# Patient Record
Sex: FEMALE | Race: WHITE | NOT HISPANIC OR LATINO | Employment: OTHER
[De-identification: names, ages, dates, MRNs, and addresses within clinical notes are randomized per-mention and may not be internally consistent; named-entity substitution may affect disease eponyms.]

---

## 2023-04-02 ENCOUNTER — HEALTH MAINTENANCE LETTER (OUTPATIENT)
Age: 68
End: 2023-04-02

## 2023-06-01 ENCOUNTER — HEALTH MAINTENANCE LETTER (OUTPATIENT)
Age: 68
End: 2023-06-01

## 2023-06-06 ENCOUNTER — OFFICE VISIT (OUTPATIENT)
Dept: CARDIOLOGY | Facility: CLINIC | Age: 68
End: 2023-06-06
Payer: COMMERCIAL

## 2023-06-06 VITALS
BODY MASS INDEX: 20.2 KG/M2 | WEIGHT: 114 LBS | SYSTOLIC BLOOD PRESSURE: 127 MMHG | DIASTOLIC BLOOD PRESSURE: 79 MMHG | OXYGEN SATURATION: 97 % | HEART RATE: 72 BPM | HEIGHT: 63 IN

## 2023-06-06 DIAGNOSIS — I77.810 DILATION OF THORACIC AORTA (H): ICD-10-CM

## 2023-06-06 DIAGNOSIS — Z82.49 FAMILY HISTORY OF AORTIC DISSECTION: Primary | ICD-10-CM

## 2023-06-06 PROCEDURE — 93000 ELECTROCARDIOGRAM COMPLETE: CPT | Performed by: INTERNAL MEDICINE

## 2023-06-06 PROCEDURE — 99204 OFFICE O/P NEW MOD 45 MIN: CPT | Performed by: INTERNAL MEDICINE

## 2023-06-06 RX ORDER — DULOXETIN HYDROCHLORIDE 60 MG/1
60 CAPSULE, DELAYED RELEASE ORAL DAILY
COMMUNITY

## 2023-06-06 RX ORDER — ASPIRIN 81 MG/1
81 TABLET ORAL DAILY
COMMUNITY

## 2023-06-06 RX ORDER — METOPROLOL TARTRATE 25 MG/1
25 TABLET, FILM COATED ORAL 2 TIMES DAILY
COMMUNITY
End: 2023-09-27

## 2023-06-06 RX ORDER — ROSUVASTATIN CALCIUM 5 MG/1
5 TABLET, COATED ORAL DAILY
COMMUNITY
End: 2023-09-27

## 2023-06-06 RX ORDER — AMLODIPINE BESYLATE 10 MG/1
5 TABLET ORAL DAILY
COMMUNITY

## 2023-06-06 NOTE — LETTER
"2023    Caprice Ureña MD  [facility] [place]   Jessie Carrero  [facility] [address]    RE: Savannah MAURICIO Chapman       Dear Colleague,     I had the pleasure of seeing Savannah MAURICIO Chapman in the [facility].           Vascular Cardiology Consultation      HPI:     This is a 68 year PMH HTN and aortic dissection history here for evaluation. Sister with negative gene testing (Lala Villalobos).    On ROS: no lens dislocation, narrow set eye-width, normal uvula, normal palate, no hypermobility in thumb joints, no skin translucency, normal skin, no vaginal prolapse or hernias, no abnormal wound healing, no easy bruising or bleeding, no chest wall deformities, + dental crowding, 5'3\".     3 kids. Normal deliveries.    Mom  - had symptoms for days: headaches, throat pain, CP - was going to be shipped to other hospital and  before getting in car , 87 yrs old   Sister at 49 had dissection, emergent surgery, watershed stroke, Type A dissection and aneurysm at 4.6 cm, flap to common iliac, had possible genetic panel that was unremarkable. This sister was 5'1''.   Other sister , suddenly at basketball game, 65 yrs old, no known aneurysm. No autopsy. Overall there are 9 kids in family: 6 have had heart issues. Brother last Sept had 4x CABG at 59 years old. Father with CABG x3 at age 60, however  suddenly 79 years old. Sister got pacemaker at age 50 for low heart rates. Other sister at 52 got pacemaker for low rates. Another sister with 4.1 cm aneurysm (had gene testing as well which was negative).        ASSESSMENT/PLAN:    This patient has diagnosis of hereditary thoracic aortic disease (HTAD). The subtypes of this include syndromic (like MFS, LDS, EDS) or non syndromic (sporadic or familial type). Elective repair is recommended at 5.0 cm of the root and/or ascending aorta in most cases of hereditary TAD, unless high risk features are present (high risk gene type, rapid growth, high risk family " "features, for example). This patient has high risk features including family history of dissection at diameter close to 4.5 cm thus this becomes her surgical threshold especially as she is outside of the normal SD for height and size (weight 114 lbs, 5'3\").     Recommendations:   Gene testing not indicated giiven sister with negative test  3D imaging with MRA chest  Now and in one year, with echo  Blood pressure goal SBP < 120 DBP < 80 mmHg  Continue metoprolol  First degree relative screening echocardiogram in children ongoing  Brain scanning with MRA or CTA head/neck at this time not indicated given no sxs  Follow up 12 months. Savannah has been with an excellent attitude about her diagnosis and continues to live an active, low stress lifestyle and during shared decision making conversation has opted for surveillance as long as able.    Clarisa Lagunas MD MSC  [facility]         PAST MEDICAL HISTORY  History reviewed. No pertinent past medical history.    CURRENT MEDICATIONS  Current Outpatient Medications   Medication Sig Dispense Refill    amLODIPine (NORVASC) 10 MG tablet Take 5 mg by mouth daily      aspirin 81 MG EC tablet Take 81 mg by mouth daily      DULoxetine (CYMBALTA) 60 MG capsule Take 60 mg by mouth daily      metoprolol tartrate (LOPRESSOR) 25 MG tablet Take 25 mg by mouth 2 times daily      midazolam (VERSED) 1 MG/ML injection Inject 2 mg into the vein once      rosuvastatin (CRESTOR) 5 MG tablet Take 5 mg by mouth daily         PAST SURGICAL HISTORY:  History reviewed. No pertinent surgical history.    ALLERGIES   No Known Allergies    FAMILY HISTORY  Family History   Problem Relation Age of Onset    Aortic dissection Mother     Aortic dissection Sister     Aortic dissection Sister        SOCIAL HISTORY  Social History     Socioeconomic History    Marital status:      Spouse name: Not on file    Number of children: Not on file    Years of education: Not on file    Highest education " "level: Not on file   Occupational History    Not on file   Tobacco Use    Smoking status: Not on file    Smokeless tobacco: Not on file   Vaping Use    Vaping status: Not on file   Substance and Sexual Activity    Alcohol use: Not on file    Drug use: Not on file    Sexual activity: Not on file   Other Topics Concern    Not on file   Social History Narrative    Not on file     Social Determinants of Health     Financial Resource Strain: Not on file   Food Insecurity: Not on file   Transportation Needs: Not on file   Physical Activity: Not on file   Stress: Not on file   Social Connections: Not on file   Intimate Partner Violence: Not on file   Housing Stability: Not on file       ROS:   Constitutional: No fever, chills, or sweats. No weight gain/loss   ENT: No visual disturbance, ear ache, epistaxis, sore throat  Allergies/Immunologic: Negative  Respiratory: No cough, hemoptysia  Cardiovascular: As per HPI  GI: No nausea, vomiting, hematemesis, melena, or hematochezia  : No urinary frequency, dysuria, or hematuria  Integument: Negative  Psychiatric: Negative  Neuro: Negative  Endocrinology: Negative   Musculoskeletal: Negative  Vascular: No walking impairment, claudication, ischemic rest pain or nonhealing wounds    EXAM:  /79   Pulse 72   Ht 1.6 m (5' 3\")   Wt 51.7 kg (114 lb)   SpO2 97%   BMI 20.19 kg/m    In general, the patient is a pleasant female in no apparent distress.    HEENT: NC/AT.  PERRLA.  EOMI.  Sclerae white, not injected.   Neck: No adenopathy.  No thyromegaly. Carotids +2/2 bilaterally without bruits.  No jugular venous distension.   Heart: RRR. Normal S1, S2 splits physiologically. No murmur, rub, click, or gallop.   Lungs: CTA.  No ronchi, wheezes, rales.  No dullness to percussion.   Abdomen: Soft, nontender, nondistended. No organomegaly. No AAA.  No bruits.   Extremities: No clubbing, cyanosis, or edema.  No wounds.   Vascular: No bruits are noted.        Thank you for allowing me " to participate in the care of your patient.      Sincerely,     Clarisa Lagunas MD     [facility]  cc:   Referred Self,

## 2023-06-06 NOTE — PROGRESS NOTES
"         Vascular Cardiology Consultation      HPI:     This is a 68 year PMH HTN and aortic dissection history here for evaluation. Sister with negative gene testing (Lala Villalobos).    On ROS: no lens dislocation, narrow set eye-width, normal uvula, normal palate, no hypermobility in thumb joints, no skin translucency, normal skin, no vaginal prolapse or hernias, no abnormal wound healing, no easy bruising or bleeding, no chest wall deformities, + dental crowding, 5'3\".     3 kids. Normal deliveries.    Mom  - had symptoms for days: headaches, throat pain, CP - was going to be shipped to other hospital and  before getting in car , 87 yrs old   Sister at 49 had dissection, emergent surgery, watershed stroke, Type A dissection and aneurysm at 4.6 cm, flap to common iliac, had possible genetic panel that was unremarkable. This sister was 5'1''.   Other sister , suddenly at basketball game, 65 yrs old, no known aneurysm. No autopsy. Overall there are 9 kids in family: 6 have had heart issues. Brother last Sept had 4x CABG at 59 years old. Father with CABG x3 at age 60, however  suddenly 79 years old. Sister got pacemaker at age 50 for low heart rates. Other sister at 52 got pacemaker for low rates. Another sister with 4.1 cm aneurysm (had gene testing as well which was negative).        ASSESSMENT/PLAN:    This patient has diagnosis of hereditary thoracic aortic disease (HTAD). The subtypes of this include syndromic (like MFS, LDS, EDS) or non syndromic (sporadic or familial type). Elective repair is recommended at 5.0 cm of the root and/or ascending aorta in most cases of hereditary TAD, unless high risk features are present (high risk gene type, rapid growth, high risk family features, for example). This patient has high risk features including family history of dissection at diameter close to 4.5 cm thus this becomes her surgical threshold especially as she is outside of the normal SD for height " "and size (weight 114 lbs, 5'3\").     Recommendations:   1. Gene testing not indicated giiven sister with negative test  2. 3D imaging with MRA chest  Now and in one year, with echo  3. Blood pressure goal SBP < 120 DBP < 80 mmHg  4. Continue metoprolol  5. First degree relative screening echocardiogram in children ongoing  6. Brain scanning with MRA or CTA head/neck at this time not indicated given no sxs  7. Follow up 12 months. Savannah has been with an excellent attitude about her diagnosis and continues to live an active, low stress lifestyle and during shared decision making conversation has opted for surveillance as long as able.    Clarisa Lagunas MD MSC  [facility] [place]         PAST MEDICAL HISTORY  History reviewed. No pertinent past medical history.    CURRENT MEDICATIONS  Current Outpatient Medications   Medication Sig Dispense Refill     amLODIPine (NORVASC) 10 MG tablet Take 5 mg by mouth daily       aspirin 81 MG EC tablet Take 81 mg by mouth daily       DULoxetine (CYMBALTA) 60 MG capsule Take 60 mg by mouth daily       metoprolol tartrate (LOPRESSOR) 25 MG tablet Take 25 mg by mouth 2 times daily       midazolam (VERSED) 1 MG/ML injection Inject 2 mg into the vein once       rosuvastatin (CRESTOR) 5 MG tablet Take 5 mg by mouth daily         PAST SURGICAL HISTORY:  History reviewed. No pertinent surgical history.    ALLERGIES   No Known Allergies    FAMILY HISTORY  Family History   Problem Relation Age of Onset     Aortic dissection Mother      Aortic dissection Sister      Aortic dissection Sister        SOCIAL HISTORY  Social History     Socioeconomic History     Marital status:      Spouse name: Not on file     Number of children: Not on file     Years of education: Not on file     Highest education level: Not on file   Occupational History     Not on file   Tobacco Use     Smoking status: Not on file     Smokeless tobacco: Not on file   Vaping Use     Vaping status: Not on file " "  Substance and Sexual Activity     Alcohol use: Not on file     Drug use: Not on file     Sexual activity: Not on file   Other Topics Concern     Not on file   Social History Narrative     Not on file     Social Determinants of Health     Financial Resource Strain: Not on file   Food Insecurity: Not on file   Transportation Needs: Not on file   Physical Activity: Not on file   Stress: Not on file   Social Connections: Not on file   Intimate Partner Violence: Not on file   Housing Stability: Not on file       ROS:   Constitutional: No fever, chills, or sweats. No weight gain/loss   ENT: No visual disturbance, ear ache, epistaxis, sore throat  Allergies/Immunologic: Negative  Respiratory: No cough, hemoptysia  Cardiovascular: As per HPI  GI: No nausea, vomiting, hematemesis, melena, or hematochezia  : No urinary frequency, dysuria, or hematuria  Integument: Negative  Psychiatric: Negative  Neuro: Negative  Endocrinology: Negative   Musculoskeletal: Negative  Vascular: No walking impairment, claudication, ischemic rest pain or nonhealing wounds    EXAM:  /79   Pulse 72   Ht 1.6 m (5' 3\")   Wt 51.7 kg (114 lb)   SpO2 97%   BMI 20.19 kg/m    In general, the patient is a pleasant female in no apparent distress.    HEENT: NC/AT.  PERRLA.  EOMI.  Sclerae white, not injected.   Neck: No adenopathy.  No thyromegaly. Carotids +2/2 bilaterally without bruits.  No jugular venous distension.   Heart: RRR. Normal S1, S2 splits physiologically. No murmur, rub, click, or gallop.   Lungs: CTA.  No ronchi, wheezes, rales.  No dullness to percussion.   Abdomen: Soft, nontender, nondistended. No organomegaly. No AAA.  No bruits.   Extremities: No clubbing, cyanosis, or edema.  No wounds.   Vascular: No bruits are noted.    "

## 2023-06-28 ENCOUNTER — HOSPITAL ENCOUNTER (OUTPATIENT)
Dept: CARDIOLOGY | Facility: CLINIC | Age: 68
Discharge: HOME | End: 2023-06-28
Attending: INTERNAL MEDICINE
Payer: COMMERCIAL

## 2023-06-28 ENCOUNTER — TELEPHONE (OUTPATIENT)
Dept: CARDIOLOGY | Facility: CLINIC | Age: 68
End: 2023-06-28

## 2023-06-28 DIAGNOSIS — I77.810 DILATION OF THORACIC AORTA (H): ICD-10-CM

## 2023-06-28 LAB — LVEF ECHO: NORMAL

## 2023-06-28 PROCEDURE — 71555 MRI ANGIO CHEST W OR W/O DYE: CPT

## 2023-06-28 PROCEDURE — 255N000002 HC RX 255 OP 636: Performed by: INTERNAL MEDICINE

## 2023-06-28 PROCEDURE — 93306 TTE W/DOPPLER COMPLETE: CPT

## 2023-06-28 PROCEDURE — 93306 TTE W/DOPPLER COMPLETE: CPT | Mod: 26 | Performed by: INTERNAL MEDICINE

## 2023-06-28 PROCEDURE — A9585 GADOBUTROL INJECTION: HCPCS | Performed by: INTERNAL MEDICINE

## 2023-06-28 PROCEDURE — 71555 MRI ANGIO CHEST W OR W/O DYE: CPT | Mod: 26 | Performed by: INTERNAL MEDICINE

## 2023-06-28 RX ORDER — AMINOPHYLLINE 25 MG/ML
100 INJECTION, SOLUTION INTRAVENOUS ONCE
Status: DISCONTINUED | OUTPATIENT
Start: 2023-06-28 | End: 2023-06-29 | Stop reason: HOSPADM

## 2023-06-28 RX ORDER — METHYLPREDNISOLONE SODIUM SUCCINATE 125 MG/2ML
125 INJECTION, POWDER, LYOPHILIZED, FOR SOLUTION INTRAMUSCULAR; INTRAVENOUS
Status: DISCONTINUED | OUTPATIENT
Start: 2023-06-28 | End: 2023-06-29 | Stop reason: HOSPADM

## 2023-06-28 RX ORDER — CAFFEINE CITRATE 20 MG/ML
60 SOLUTION INTRAVENOUS
Status: DISCONTINUED | OUTPATIENT
Start: 2023-06-28 | End: 2023-06-29 | Stop reason: HOSPADM

## 2023-06-28 RX ORDER — REGADENOSON 0.08 MG/ML
0.4 INJECTION, SOLUTION INTRAVENOUS ONCE
Status: DISCONTINUED | OUTPATIENT
Start: 2023-06-28 | End: 2023-06-29 | Stop reason: HOSPADM

## 2023-06-28 RX ORDER — GADOBUTROL 604.72 MG/ML
10 INJECTION INTRAVENOUS ONCE
Status: COMPLETED | OUTPATIENT
Start: 2023-06-28 | End: 2023-06-28

## 2023-06-28 RX ORDER — DIPHENHYDRAMINE HCL 25 MG
25 CAPSULE ORAL
Status: DISCONTINUED | OUTPATIENT
Start: 2023-06-28 | End: 2023-06-29 | Stop reason: HOSPADM

## 2023-06-28 RX ORDER — ALBUTEROL SULFATE 90 UG/1
2 AEROSOL, METERED RESPIRATORY (INHALATION) EVERY 5 MIN PRN
Status: DISCONTINUED | OUTPATIENT
Start: 2023-06-28 | End: 2023-06-29 | Stop reason: HOSPADM

## 2023-06-28 RX ORDER — DIAZEPAM 5 MG
5 TABLET ORAL EVERY 30 MIN PRN
Status: DISCONTINUED | OUTPATIENT
Start: 2023-06-28 | End: 2023-06-29 | Stop reason: HOSPADM

## 2023-06-28 RX ORDER — ACYCLOVIR 200 MG/1
0-1 CAPSULE ORAL
Status: DISCONTINUED | OUTPATIENT
Start: 2023-06-28 | End: 2023-06-29 | Stop reason: HOSPADM

## 2023-06-28 RX ORDER — DIPHENHYDRAMINE HYDROCHLORIDE 50 MG/ML
25-50 INJECTION INTRAMUSCULAR; INTRAVENOUS
Status: DISCONTINUED | OUTPATIENT
Start: 2023-06-28 | End: 2023-06-29 | Stop reason: HOSPADM

## 2023-06-28 RX ORDER — ONDANSETRON 2 MG/ML
4 INJECTION INTRAMUSCULAR; INTRAVENOUS
Status: DISCONTINUED | OUTPATIENT
Start: 2023-06-28 | End: 2023-06-29 | Stop reason: HOSPADM

## 2023-06-28 RX ADMIN — GADOBUTROL 10 ML: 604.72 INJECTION INTRAVENOUS at 14:12

## 2023-07-06 NOTE — TELEPHONE ENCOUNTER
RN updated patient via [employer].       Thanks!     This was to get Savannah's baseline. Per my note we are going to do a scan once a year unless there was anything really concerning. There is mild dilation and we will use this measurement as baseline.     Best,   Dr. Lagunas

## 2023-09-25 DIAGNOSIS — I77.810 DILATION OF THORACIC AORTA (H): Primary | ICD-10-CM

## 2023-09-27 RX ORDER — METOPROLOL SUCCINATE 25 MG/1
25 TABLET, EXTENDED RELEASE ORAL DAILY
Qty: 90 TABLET | Refills: 3 | Status: SHIPPED | OUTPATIENT
Start: 2023-09-27 | End: 2024-10-01

## 2023-09-27 RX ORDER — AMLODIPINE BESYLATE 5 MG/1
5 TABLET ORAL DAILY
Qty: 90 TABLET | Refills: 3 | Status: SHIPPED | OUTPATIENT
Start: 2023-09-27 | End: 2024-06-18

## 2023-09-27 RX ORDER — ROSUVASTATIN CALCIUM 5 MG/1
5 TABLET, COATED ORAL DAILY
Qty: 90 TABLET | Refills: 3 | Status: SHIPPED | OUTPATIENT
Start: 2023-09-27 | End: 2024-10-01

## 2024-06-08 ENCOUNTER — HEALTH MAINTENANCE LETTER (OUTPATIENT)
Age: 69
End: 2024-06-08

## 2024-06-18 ENCOUNTER — OFFICE VISIT (OUTPATIENT)
Dept: CARDIOLOGY | Facility: CLINIC | Age: 69
End: 2024-06-18
Payer: COMMERCIAL

## 2024-06-18 VITALS
HEART RATE: 107 BPM | SYSTOLIC BLOOD PRESSURE: 135 MMHG | DIASTOLIC BLOOD PRESSURE: 88 MMHG | RESPIRATION RATE: 18 BRPM | WEIGHT: 117 LBS | HEIGHT: 63 IN | BODY MASS INDEX: 20.73 KG/M2 | OXYGEN SATURATION: 99 %

## 2024-06-18 DIAGNOSIS — I77.810 DILATION OF THORACIC AORTA (H): Primary | ICD-10-CM

## 2024-06-18 PROCEDURE — 99214 OFFICE O/P EST MOD 30 MIN: CPT | Performed by: INTERNAL MEDICINE

## 2024-06-18 NOTE — PROGRESS NOTES
"         Vascular Cardiology      HPI:     This is a 68 year PMH HTN and aortic dissection history here for evaluation. Sister with negative gene testing (Lala Villalobos).     On ROS: no lens dislocation, narrow set eye-width, normal uvula, normal palate, no hypermobility in thumb joints, no skin translucency, normal skin, no vaginal prolapse or hernias, no abnormal wound healing, no easy bruising or bleeding, no chest wall deformities, + dental crowding, 5'3\".      3 kids. Normal deliveries.     Mom  - had symptoms for days: headaches, throat pain, CP - was going to be shipped to other hospital and  before getting in car , 87 yrs old   Sister at 49 had dissection, emergent surgery, watershed stroke, Type A dissection and aneurysm at 4.6 cm, flap to common iliac, had possible genetic panel that was unremarkable. This sister was 5'1''.   Other sister , suddenly at basketball game, 65 yrs old, no known aneurysm. No autopsy. Overall there are 9 kids in family: 6 have had heart issues. Brother last Sept had 4x CABG at 59 years old. Father with CABG x3 at age 60, however  suddenly 79 years old. Sister got pacemaker at age 50 for low heart rates. Other sister at 52 got pacemaker for low rates. Another sister with 4.1 cm aneurysm (had gene testing as well which was negative).          ASSESSMENT/PLAN:     This patient has diagnosis of hereditary thoracic aortic disease (HTAD). The subtypes of this include syndromic (like MFS, LDS, EDS) or non syndromic (sporadic or familial type). Elective repair is recommended at 5.0 cm of the root and/or ascending aorta in most cases of hereditary TAD, unless high risk features are present (high risk gene type, rapid growth, high risk family features, for example). This patient has high risk features including family history of dissection at diameter close to 4.5 cm thus this becomes her surgical threshold especially as she is outside of the normal SD for height and size " "(weight 114 lbs, 5'3\").      Recommendations:   Gene testing not indicated given sister with negative test  3D imaging with MRA chest  Now and in one year, with echo  Blood pressure goal SBP < 120 DBP < 80 mmHg  Continue metoprolol  First degree relative screening echocardiogram in children ongoing  Brain scanning with MRA or CTA head/neck at this time not indicated given no sxs  Follow up 12 months. Savannah has been with an excellent attitude about her diagnosis and continues to live an active, low stress lifestyle and during shared decision making conversation has opted for surveillance as long as able.     Clarisa Lagunas MD MSC  [facility]       PAST MEDICAL HISTORY  No past medical history on file.    CURRENT MEDICATIONS  Current Outpatient Medications   Medication Sig Dispense Refill    amLODIPine (NORVASC) 10 MG tablet Take 5 mg by mouth daily      aspirin 81 MG EC tablet Take 81 mg by mouth daily      DULoxetine (CYMBALTA) 60 MG capsule Take 60 mg by mouth daily      metoprolol succinate ER (TOPROL XL) 25 MG 24 hr tablet Take 1 Tablet (25 mg) by mouth daily. 90 tablet 3    midazolam (VERSED) 1 MG/ML injection Inject 2 mg into the vein once      rosuvastatin (CRESTOR) 5 MG tablet Take 1 Tablet (5 mg) by mouth daily. 90 tablet 3    amLODIPine (NORVASC) 5 MG tablet Take 1 Tablet (5 mg) by mouth daily. 90 tablet 3       PAST SURGICAL HISTORY:  No past surgical history on file.    ALLERGIES     Allergies   Allergen Reactions    Latex Rash       FAMILY HISTORY  Family History   Problem Relation Age of Onset    Aortic dissection Mother     Aortic dissection Sister     Aortic dissection Sister          SOCIAL HISTORY  Social History     Socioeconomic History    Marital status:      Spouse name: Not on file    Number of children: Not on file    Years of education: Not on file    Highest education level: Not on file   Occupational History    Not on file   Tobacco Use    Smoking status: Not on file    " "Smokeless tobacco: Not on file   Substance and Sexual Activity    Alcohol use: Not on file    Drug use: Not on file    Sexual activity: Not on file   Other Topics Concern    Not on file   Social History Narrative    Not on file     Social Determinants of Health     Financial Resource Strain: Not on file   Food Insecurity: Not on file   Transportation Needs: Not on file   Physical Activity: Not on file   Stress: Not on file   Social Connections: Not on file   Interpersonal Safety: Not on file   Housing Stability: Not on file       ROS:   Constitutional: No fever, chills, or sweats. No weight gain/loss   ENT: No visual disturbance, ear ache, epistaxis, sore throat  Allergies/Immunologic: Negative  Respiratory: No cough, hemoptysia  Cardiovascular: As per HPI  GI: No nausea, vomiting, hematemesis, melena, or hematochezia  : No urinary frequency, dysuria, or hematuria  Integument: Negative  Psychiatric: Negative  Neuro: Negative  Endocrinology: Negative   Musculoskeletal: Negative  Vascular: No walking impairment, claudication, ischemic rest pain or nonhealing wounds    EXAM:  /88 (BP Location: Right arm, Patient Position: Sitting, Cuff Size: Adult Regular)   Pulse 107   Resp 18   Ht 1.6 m (5' 3\")   Wt 53.1 kg (117 lb)   SpO2 99%   BMI 20.73 kg/m    In general, the patient is a pleasant female in no apparent distress.    HEENT: NC/AT.  PERRLA.  EOMI.  Sclerae white, not injected.  Nares clear.  Pharynx without erythema or exudate.  Dentition intact.    Neck: No adenopathy.  No thyromegaly. Carotids +2/2 bilaterally without bruits.  No jugular venous distension.   Heart: RRR. Normal S1, S2 splits physiologically. No murmur, rub, click, or gallop. The PMI is in the 5th ICS in the midclavicular line. There is no heave.    Lungs: CTA.  No ronchi, wheezes, rales.  No dullness to percussion.   Abdomen: Soft, nontender, nondistended. No organomegaly. No AAA.  No bruits.   Extremities: No clubbing, cyanosis, or " "edema.  No wounds. No varicose veins signs of chronic venous insufficiency.   Vascular: No bruits are noted.    Labs:  LIPID RESULTS:  No results found for: \"CHOL\", \"HDL\", \"LDL\", \"TRIG\", \"CHOLHDLRATIO\", \"NHDL\"    LIVER ENZYME RESULTS:  No results found for: \"AST\", \"ALT\"    CBC RESULTS:  No results found for: \"WBC\", \"RBC\", \"HGB\", \"HCT\", \"MCV\", \"MCH\", \"MCHC\", \"RDW\", \"PLT\"    BMP RESULTS:  No results found for: \"NA\", \"POTASSIUM\", \"CHLORIDE\", \"CO2\", \"ANIONGAP\", \"GLC\", \"BUN\", \"CR\", \"GFRESTIMATED\", \"GFRESTBLACK\", \"KARLA\"     A1C RESULTS:  No results found for: \"A1C\"      "

## 2024-06-18 NOTE — Clinical Note
"2024    Caprice Ureña MD  [facility]   Jessie Carrero  [facility] [address]    RE: Savannah MARTÍNEZ Adela       Dear Colleague,     I had the pleasure of seeing Savannah MARTÍNEZ Adela in the [facility].  HPI:     This is a 68 year PMH HTN and aortic dissection history here for evaluation. Sister with negative gene testing (Lala Villalobos).     On ROS: no lens dislocation, narrow set eye-width, normal uvula, normal palate, no hypermobility in thumb joints, no skin translucency, normal skin, no vaginal prolapse or hernias, no abnormal wound healing, no easy bruising or bleeding, no chest wall deformities, + dental crowding, 5'3\".      3 kids. Normal deliveries.     Mom  - had symptoms for days: headaches, throat pain, CP - was going to be shipped to other hospital and  before getting in car , 87 yrs old   Sister at 49 had dissection, emergent surgery, watershed stroke, Type A dissection and aneurysm at 4.6 cm, flap to common iliac, had possible genetic panel that was unremarkable. This sister was 5'1''.   Other sister , suddenly at basketball game, 65 yrs old, no known aneurysm. No autopsy. Overall there are 9 kids in family: 6 have had heart issues. Brother last Sept had 4x CABG at 59 years old. Father with CABG x3 at age 60, however  suddenly 79 years old. Sister got pacemaker at age 50 for low heart rates. Other sister at 52 got pacemaker for low rates. Another sister with 4.1 cm aneurysm (had gene testing as well which was negative).     Having neck/pelvic         ASSESSMENT/PLAN:     This patient has diagnosis of hereditary thoracic aortic disease (HTAD). The subtypes of this include syndromic (like MFS, LDS, EDS) or non syndromic (sporadic or familial type). Elective repair is recommended at 5.0 cm of the root and/or ascending aorta in most cases of hereditary TAD, unless high risk features are present (high risk gene type, rapid growth, high risk family features, for " "example). This patient has high risk features including family history of dissection at diameter close to 4.5 cm thus this becomes her surgical threshold especially as she is outside of the normal SD for height and size (weight 114 lbs, 5'3\").      Recommendations:   Gene testing not indicated giiven sister with negative test  3D imaging with MRA chest  Now and in one year, with echo  Blood pressure goal SBP < 120 DBP < 80 mmHg  Continue metoprolol  First degree relative screening echocardiogram in children ongoing  Brain scanning with MRA or CTA head/neck at this time not indicated given no sxs  Follow up 12 months. Savannah has been with an excellent attitude about her diagnosis and continues to live an active, low stress lifestyle and during shared decision making conversation has opted for surveillance as long as able.     Clarisa Lagunas MD MSC  [facility]       PAST MEDICAL HISTORY  No past medical history on file.    CURRENT MEDICATIONS  Current Outpatient Medications   Medication Sig Dispense Refill    amLODIPine (NORVASC) 10 MG tablet Take 5 mg by mouth daily      aspirin 81 MG EC tablet Take 81 mg by mouth daily      DULoxetine (CYMBALTA) 60 MG capsule Take 60 mg by mouth daily      metoprolol succinate ER (TOPROL XL) 25 MG 24 hr tablet Take 1 Tablet (25 mg) by mouth daily. 90 tablet 3    midazolam (VERSED) 1 MG/ML injection Inject 2 mg into the vein once      rosuvastatin (CRESTOR) 5 MG tablet Take 1 Tablet (5 mg) by mouth daily. 90 tablet 3    amLODIPine (NORVASC) 5 MG tablet Take 1 Tablet (5 mg) by mouth daily. 90 tablet 3       PAST SURGICAL HISTORY:  No past surgical history on file.    ALLERGIES     Allergies   Allergen Reactions    Latex Rash       FAMILY HISTORY  Family History   Problem Relation Age of Onset    Aortic dissection Mother     Aortic dissection Sister     Aortic dissection Sister        VASCULAR FAMILY HISTORY  1st order relative with atherosclerotic PAD: {YES / " "NO:396919::\"Yes\"}  1st order relative with AAA: {YES / NO:905784::\"Yes\"}    SOCIAL HISTORY  Social History     Socioeconomic History    Marital status:      Spouse name: Not on file    Number of children: Not on file    Years of education: Not on file    Highest education level: Not on file   Occupational History    Not on file   Tobacco Use    Smoking status: Not on file    Smokeless tobacco: Not on file   Substance and Sexual Activity    Alcohol use: Not on file    Drug use: Not on file    Sexual activity: Not on file   Other Topics Concern    Not on file   Social History Narrative    Not on file     Social Determinants of Health     Financial Resource Strain: Not on file   Food Insecurity: Not on file   Transportation Needs: Not on file   Physical Activity: Not on file   Stress: Not on file   Social Connections: Not on file   Interpersonal Safety: Not on file   Housing Stability: Not on file       ROS:   Constitutional: No fever, chills, or sweats. No weight gain/loss   ENT: No visual disturbance, ear ache, epistaxis, sore throat  Allergies/Immunologic: Negative  Respiratory: No cough, hemoptysia  Cardiovascular: As per HPI  GI: No nausea, vomiting, hematemesis, melena, or hematochezia  : No urinary frequency, dysuria, or hematuria  Integument: Negative  Psychiatric: Negative  Neuro: Negative  Endocrinology: Negative   Musculoskeletal: Negative  Vascular: No walking impairment, claudication, ischemic rest pain or nonhealing wounds    EXAM:  /88 (BP Location: Right arm, Patient Position: Sitting, Cuff Size: Adult Regular)   Pulse 107   Resp 18   Ht 1.6 m (5' 3\")   Wt 53.1 kg (117 lb)   SpO2 99%   BMI 20.73 kg/m    In general, the patient is a pleasant female in no apparent distress.    HEENT: NC/AT.  PERRLA.  EOMI.  Sclerae white, not injected.  Nares clear.  Pharynx without erythema or exudate.  Dentition intact.    Neck: No adenopathy.  No thyromegaly. Carotids +2/2 bilaterally without " "bruits.  No jugular venous distension.   Heart: RRR. Normal S1, S2 splits physiologically. No murmur, rub, click, or gallop. The PMI is in the 5th ICS in the midclavicular line. There is no heave.    Lungs: CTA.  No ronchi, wheezes, rales.  No dullness to percussion.   Abdomen: Soft, nontender, nondistended. No organomegaly. No AAA.  No bruits.   Extremities: No clubbing, cyanosis, or edema.  No wounds. No varicose veins signs of chronic venous insufficiency.   Vascular: No bruits are noted.   Brachial Radial Ulnar Femoral Popliteal DP PT   Left ***/2 ***/2 ***/2 ***/2 ***/2 ***/2 ***/2   Right ***/2 ***/2 ***/2 ***/2 ***/2 ***/2 ***/2     Labs:  LIPID RESULTS:  No results found for: \"CHOL\", \"HDL\", \"LDL\", \"TRIG\", \"CHOLHDLRATIO\", \"NHDL\"    LIVER ENZYME RESULTS:  No results found for: \"AST\", \"ALT\"    CBC RESULTS:  No results found for: \"WBC\", \"RBC\", \"HGB\", \"HCT\", \"MCV\", \"MCH\", \"MCHC\", \"RDW\", \"PLT\"    BMP RESULTS:  No results found for: \"NA\", \"POTASSIUM\", \"CHLORIDE\", \"CO2\", \"ANIONGAP\", \"GLC\", \"BUN\", \"CR\", \"GFRESTIMATED\", \"GFRESTBLACK\", \"KARLA\"     A1C RESULTS:  No results found for: \"A1C\"    Procedures:      Assessment and Plan: ***      Thank you for allowing me to participate in the care of your patient.      Sincerely,     Clarisa Lagunas MD     [facility]  cc:   Clarisa Lagunas MD  [address] [place] [id number]      "

## 2024-06-18 NOTE — PATIENT INSTRUCTIONS
1. Echocardiogram and MRA chest at [facility]  2. Echocardiogram again in one year then follow up Dr. Lagunas (any location)

## 2024-08-06 ENCOUNTER — TELEPHONE (OUTPATIENT)
Dept: CARDIOLOGY | Facility: CLINIC | Age: 69
End: 2024-08-06

## 2024-08-06 ENCOUNTER — HOSPITAL ENCOUNTER (OUTPATIENT)
Dept: CARDIOLOGY | Facility: CLINIC | Age: 69
Discharge: HOME | End: 2024-08-06
Attending: INTERNAL MEDICINE
Payer: COMMERCIAL

## 2024-08-06 VITALS — SYSTOLIC BLOOD PRESSURE: 129 MMHG | DIASTOLIC BLOOD PRESSURE: 78 MMHG

## 2024-08-06 DIAGNOSIS — I77.810 DILATION OF THORACIC AORTA (H): ICD-10-CM

## 2024-08-06 LAB — LVEF ECHO: NORMAL

## 2024-08-06 PROCEDURE — A9585 GADOBUTROL INJECTION: HCPCS | Performed by: INTERNAL MEDICINE

## 2024-08-06 PROCEDURE — 71555 MRI ANGIO CHEST W OR W/O DYE: CPT | Mod: 26 | Performed by: INTERNAL MEDICINE

## 2024-08-06 PROCEDURE — 93306 TTE W/DOPPLER COMPLETE: CPT | Mod: 26 | Performed by: INTERNAL MEDICINE

## 2024-08-06 PROCEDURE — 255N000002 HC RX 255 OP 636: Performed by: INTERNAL MEDICINE

## 2024-08-06 PROCEDURE — 93306 TTE W/DOPPLER COMPLETE: CPT

## 2024-08-06 PROCEDURE — 71555 MRI ANGIO CHEST W OR W/O DYE: CPT

## 2024-08-06 RX ORDER — GADOBUTROL 604.72 MG/ML
10 INJECTION INTRAVENOUS ONCE
Status: COMPLETED | OUTPATIENT
Start: 2024-08-06 | End: 2024-08-06

## 2024-08-06 RX ORDER — DIAZEPAM 5 MG
5 TABLET ORAL EVERY 30 MIN PRN
Status: DISCONTINUED | OUTPATIENT
Start: 2024-08-06 | End: 2024-08-07 | Stop reason: HOSPADM

## 2024-08-06 RX ORDER — LORAZEPAM 0.5 MG/1
0.5 TABLET ORAL EVERY 10 MIN PRN
Status: DISCONTINUED | OUTPATIENT
Start: 2024-08-06 | End: 2024-08-07 | Stop reason: HOSPADM

## 2024-08-06 RX ORDER — SODIUM CHLORIDE 9 MG/ML
INJECTION, SOLUTION INTRAVENOUS CONTINUOUS
Status: ACTIVE | OUTPATIENT
Start: 2024-08-06 | End: 2024-08-06

## 2024-08-06 RX ORDER — LIDOCAINE 40 MG/G
CREAM TOPICAL
Status: DISCONTINUED | OUTPATIENT
Start: 2024-08-06 | End: 2024-08-07 | Stop reason: HOSPADM

## 2024-08-06 RX ORDER — NITROGLYCERIN 0.4 MG/1
0.4 TABLET SUBLINGUAL EVERY 5 MIN PRN
Status: DISCONTINUED | OUTPATIENT
Start: 2024-08-06 | End: 2024-08-06 | Stop reason: HOSPADM

## 2024-08-06 RX ADMIN — GADOBUTROL 10 ML: 604.72 INJECTION INTRAVENOUS at 12:07

## 2024-08-06 NOTE — TELEPHONE ENCOUNTER
"Please see MRA results and advise on any further recommendations, per last OV note:    \"This patient has diagnosis of hereditary thoracic aortic disease (HTAD). The subtypes of this include syndromic (like MFS, LDS, EDS) or non syndromic (sporadic or familial type). Elective repair is recommended at 5.0 cm of the root and/or ascending aorta in most cases of hereditary TAD, unless high risk features are present (high risk gene type, rapid growth, high risk family features, for example). This patient has high risk features including family history of dissection at diameter close to 4.5 cm thus this becomes her surgical threshold especially as she is outside of the normal SD for height and size (weight 114 lbs, 5'3\"). \"    1. The aortic root measures 3.8 cm x 3.7 cm, sinus to sinus measurement. There is mild  dilatation of the ascending thoracic aorta, which measures 4.1 cm x 4.0 cm proximal to the main pulmonary  artery level. The  transverse arch and descending thoracic aorta are normal in size without an aneurysm or  dissection.     Aortic Size Index:  2.7 cm/m2     Aortic Height Index: 2.6 cm/m     2. The aortic arch is left sided. There is normal branching of the arch vessels. There is no coarctation.     3. The main and proximal branch pulmonary arteries are normal in size.      4. The systemic venous connections are normal.   "

## 2024-08-12 NOTE — TELEPHONE ENCOUNTER
Clarisa Lagunas MD  [facility] Heart Team 43 days ago       Great thanks. We have her indexed sizes now to her height which we can move forward to establish as her baseline (the ASI and AHI). All future studies will need to be MR angiogram with cardiology at [facility] (we are the only group that does the indexed measurements).    Best,    Dr. Lagunas       Pt updated via [facility]

## 2024-09-30 DIAGNOSIS — I77.810 DILATION OF THORACIC AORTA (H): ICD-10-CM

## 2024-10-01 RX ORDER — METOPROLOL SUCCINATE 25 MG/1
25 TABLET, EXTENDED RELEASE ORAL DAILY
Qty: 90 TABLET | Refills: 2 | Status: SHIPPED | OUTPATIENT
Start: 2024-10-01

## 2024-10-01 RX ORDER — ROSUVASTATIN CALCIUM 5 MG/1
5 TABLET, COATED ORAL DAILY
Qty: 90 TABLET | Refills: 2 | Status: SHIPPED | OUTPATIENT
Start: 2024-10-01

## 2024-10-01 NOTE — TELEPHONE ENCOUNTER
[facility] Cardiology Refill Guideline reviewed.  Medication meets criteria for refill.    Leti Najera RN

## 2024-11-05 ENCOUNTER — MYC MEDICAL ADVICE (OUTPATIENT)
Dept: CARDIOLOGY | Facility: CLINIC | Age: 69
End: 2024-11-05
Payer: COMMERCIAL

## 2024-11-05 DIAGNOSIS — I77.810 DILATION OF THORACIC AORTA (H): ICD-10-CM

## 2024-11-05 DIAGNOSIS — Z82.49 FAMILY HISTORY OF AORTIC DISSECTION: Primary | ICD-10-CM

## 2024-11-05 RX ORDER — AMLODIPINE BESYLATE 5 MG/1
5 TABLET ORAL DAILY
Qty: 90 TABLET | Refills: 3 | Status: SHIPPED | OUTPATIENT
Start: 2024-11-05

## 2025-06-02 ENCOUNTER — RESULTS FOLLOW-UP (OUTPATIENT)
Dept: CARDIOLOGY | Facility: CLINIC | Age: 70
End: 2025-06-02

## 2025-06-02 ENCOUNTER — HOSPITAL ENCOUNTER (OUTPATIENT)
Dept: CARDIOLOGY | Facility: CLINIC | Age: 70
Discharge: HOME | End: 2025-06-02
Attending: INTERNAL MEDICINE | Admitting: INTERNAL MEDICINE
Payer: COMMERCIAL

## 2025-06-02 DIAGNOSIS — I77.810 DILATION OF THORACIC AORTA: ICD-10-CM

## 2025-06-02 LAB — LVEF ECHO: NORMAL

## 2025-06-02 PROCEDURE — 93306 TTE W/DOPPLER COMPLETE: CPT | Mod: 26 | Performed by: INTERNAL MEDICINE

## 2025-06-02 PROCEDURE — 93306 TTE W/DOPPLER COMPLETE: CPT

## 2025-06-04 ENCOUNTER — OFFICE VISIT (OUTPATIENT)
Dept: CARDIOLOGY | Facility: CLINIC | Age: 70
End: 2025-06-04
Attending: INTERNAL MEDICINE
Payer: COMMERCIAL

## 2025-06-04 ENCOUNTER — MYC MEDICAL ADVICE (OUTPATIENT)
Dept: CARDIOLOGY | Facility: CLINIC | Age: 70
End: 2025-06-04

## 2025-06-04 VITALS
DIASTOLIC BLOOD PRESSURE: 81 MMHG | WEIGHT: 117.1 LBS | OXYGEN SATURATION: 99 % | SYSTOLIC BLOOD PRESSURE: 114 MMHG | BODY MASS INDEX: 20.75 KG/M2 | HEART RATE: 69 BPM | HEIGHT: 63 IN

## 2025-06-04 DIAGNOSIS — I77.810 DILATION OF THORACIC AORTA: ICD-10-CM

## 2025-06-04 NOTE — PATIENT INSTRUCTIONS
Thank you for your visit with the [facility] today.    Today's Summary:    Your recent echocardiogram from 6/2 shows that the aortic root and ascending aorta is stable with no evidence of growth, which is good news  We'll repeat imaging with MRA and echocardiogram next year  Your blood pressure looks great- no changes  Let me know if you've had a more recent cholesterol check, if not then we can check at your earliest convenience     Follow-up:  Cardiology follow up at [facility]: 1 year with Dr. Lagunas or myself.     Cardiology Scheduling ~[phone]  Cardiology Clinic RN ~ [phone]    It was a pleasure seeing you today.     LARRY Parra, CNP  Certified Nurse Practitioner  [facility]  June 4, 2025  ________________________________________________________

## 2025-06-04 NOTE — PROGRESS NOTES
~Cardiology Clinic Visit~    Primary Cardiologist: Dr. Lagunas  Reason for visit: Annual cardiology follow-up      Savannah Chapman MRN# [id number]   YOB: 1955 Age: 70 year old       HPI:    Savannah Chapman is a delightful 70 year old patient with past medical history significant for:    Family history of Type a aortic dissection  Hypertension    I had the opportunity to see Savannah Chapman for cardiology follow up.  In review, the patient has a family history of type a aortic dissection.  Patient's mother  at age 87, sister with type a dissection at age 49, another sister  suddenly during a basketball game at 65 with no known aneurysm but no autopsy.  She is 1 of 9 children with 6 having heart issues including brother with CABG x 4 at age 59.  Father with CABG at age 60.  2 sisters with history of pacemakers at ages 50 and 52.  A third sister with aneurysm measuring 4.1 cm with negative gene testing.      She was referred to vascular cardiology for dilatation of thoracic aorta in 2023.  Chest MRA at that time showed mild dilatation of aortic root measuring 3.8 x 3.4 and mild dilatation of ascending thoracic aorta measuring 4.1 x 4.1 cm.    Seen last by Dr. Lagunas on 2024 she was doing well from a cardiovascular standpoint.  A repeat MRA chest from 2024 is notable for aortic root measuring 3.8 x 3.7 cm and ascending aorta measuring 4.1 x 4.0 cm.  An echocardiogram from 2024 shows LVEF 60%, RV structure, function and size, no valvular disease, and ascending aorta measuring 4.1 cm.    A repeat echocardiogram from 2025 which shows LVEF 65-70%, trileaflet aortic valve with trace regurgitation stable ascending aorta measuring 4.1 cm, and aortic root measuring 3.6 cm.    Ms. Chapman returns for annual cardiology follow up and review of recent echocardiogram. Overall reports feeling well from a cardiac standpoint. She denies chest pain, pressure, or tightness. Occasional shortness of  breath that she believes is due to deconditioning due to physical limitations with right leg pain/weakness. No lower extremity swelling, orthopnea, or PND. No lightheadedness, dizziness, or syncope. No palpitations or racing heart.     Assessment:  Family history of Type a aortic dissection  Gene testing not indicated with history of negative testing in sister  Blood pressure well controlled  Echocardiogram 6/2/2025: ascending aorta stable at 4.1 cm, aortic root 3.6 cm  Hypertension, goal SBP < 120, DBP < 80 mmHg  BP today 114/81  Metoprolol succinate 25 mg daily, amlodipine 5 mg  Hyperlipidemia  Lipid panel 4/29/2025: cholesterol 216, HDL, 108, LDL 85,   Rosuvastatin 5 mg        Plan:   It was my pleasure to see Ms. Chapman for cardiology follow up today. Overall, she is doing very well from a cardiac standpoint. We reviewed her recent echocardiogram which shows stable ascending aortic dilatation measuring 4.1 cm. Will plan to repeat chest MRA and echocardiogram in one year.  Blood pressure well controlled on current regimen- no changes  Follow up with Dr. Lagunas in 1 year, sooner if any new concerns    Thank you for the opportunity to participate in this pleasant patient's care.    We would be happy to see this patient sooner for any concerns in the meantime.    LARRY Parra, CNP   Nurse Practitioner  [facility]    A total of 40 minutes was spent with patient, on chart review and documentation today.         Orders Placed This Encounter   Procedures    MRA Chest with Contrast    Follow-Up with Cardiology    Echocardiogram Complete     No orders of the defined types were placed in this encounter.    Medications Discontinued During This Encounter   Medication Reason    DULoxetine (CYMBALTA) 60 MG capsule Stopped by Patient (No AVS)    midazolam (VERSED) 1 MG/ML injection Stopped by Patient (No AVS)         Encounter Diagnosis   Name Primary?    Dilation of thoracic aorta   "      CURRENT MEDICATIONS:  Current Outpatient Medications   Medication Sig Dispense Refill    amLODIPine (NORVASC) 5 MG tablet Take 1 tablet (5 mg) by mouth daily. 90 tablet 3    aspirin 81 MG EC tablet Take 81 mg by mouth daily      metoprolol succinate ER (TOPROL XL) 25 MG 24 hr tablet Take 1 Tablet (25 mg) by mouth daily. 90 tablet 2    rosuvastatin (CRESTOR) 5 MG tablet Take 1 Tablet (5 mg) by mouth daily. 90 tablet 2       ALLERGIES     Allergies   Allergen Reactions    Latex Rash       PAST MEDICAL HISTORY:  No past medical history on file.    PAST SURGICAL HISTORY:  Past Surgical History:   Procedure Laterality Date    IR LUMBAR PUNCTURE  7/10/2024       FAMILY HISTORY:  Family History   Problem Relation Age of Onset    Aortic dissection Mother     Aortic dissection Sister     Aortic dissection Sister        SOCIAL HISTORY:  Social History     Socioeconomic History    Marital status:      Spouse name: None    Number of children: None    Years of education: None    Highest education level: None   Tobacco Use    Smoking status: Never    Smokeless tobacco: Never   Substance and Sexual Activity    Alcohol use: Yes     Comment: couple glasses of wine at night       Review of Systems:  Skin:        Eyes:       ENT:       Respiratory:       Cardiovascular:       Gastroenterology:      Genitourinary:       Musculoskeletal:       Neurologic:       Psychiatric:       Heme/Lymph/Imm:       Endocrine:         Physical Exam:    Vitals: /81 (BP Location: Left arm, Patient Position: Sitting)   Pulse 69   Ht 1.6 m (5' 3\")   Wt 53.1 kg (117 lb 1.6 oz)   SpO2 99%   BMI 20.74 kg/m    Constitutional: Well nourished and in no apparent distress.  Neck: Supple.  Respiratory: Breathing non-labored. Lungs clear to auscultation bilaterally. No crackles, wheezes, rhonchi, or rales.  Cardiovascular:  Regular rate and rhythm, normal S1 and S2. No murmur, rub, or gallop.  Skin: Warm, dry.   Extremities: No " "edema.  Neurologic: No gross motor deficits. Alert, awake, and oriented to person, place and time.  Psychiatric: Affect appropriate.        Recent Lab Results:  LIPID RESULTS:  No results found for: \"CHOL\", \"HDL\", \"LDL\", \"TRIG\", \"CHOLHDLRATIO\"    LIVER ENZYME RESULTS:  No results found for: \"AST\", \"ALT\"    CBC RESULTS:  No results found for: \"WBC\", \"RBC\", \"HGB\", \"HCT\", \"MCV\", \"MCH\", \"MCHC\", \"RDW\", \"PLT\"    BMP RESULTS:  No results found for: \"NA\", \"POTASSIUM\", \"CHLORIDE\", \"CO2\", \"ANIONGAP\", \"GLC\", \"BUN\", \"CR\", \"GFRESTIMATED\", \"GFRESTBLACK\", \"KARLA\"     A1C RESULTS:  No results found for: \"A1C\"    INR RESULTS:  No results found for: \"INR\"        CC  Clarisa Lagunas MD  [address] [place] [address]                "

## 2025-06-04 NOTE — LETTER
2025    Caprice Ureña MD  [facility] [place]   [facility] [id number]    RE: Savannah Chapman       Dear Colleague,     I had the pleasure of seeing Savannah hCapman in the [facility].          ~Cardiology Clinic Visit~    Primary Cardiologist: Dr. Lagunas  Reason for visit: Annual cardiology follow-up      Savannah Chapman MRN# [id number]   YOB: 1955 Age: 70 year old       HPI:    Savannah Chapman is a delightful 70 year old patient with past medical history significant for:    Family history of Type a aortic dissection  Hypertension    I had the opportunity to see Savannah Chapman for cardiology follow up.  In review, the patient has a family history of type a aortic dissection.  Patient's mother  at age 87, sister with type a dissection at age 49, another sister  suddenly during a basketball game at 65 with no known aneurysm but no autopsy.  She is 1 of 9 children with 6 having heart issues including brother with CABG x 4 at age 59.  Father with CABG at age 60.  2 sisters with history of pacemakers at ages 50 and 52.  A third sister with aneurysm measuring 4.1 cm with negative gene testing.      She was referred to vascular cardiology for dilatation of thoracic aorta in 2023.  Chest MRA at that time showed mild dilatation of aortic root measuring 3.8 x 3.4 and mild dilatation of ascending thoracic aorta measuring 4.1 x 4.1 cm.    Seen last by Dr. Lagunas on 2024 she was doing well from a cardiovascular standpoint.  A repeat MRA chest from 2024 is notable for aortic root measuring 3.8 x 3.7 cm and ascending aorta measuring 4.1 x 4.0 cm.  An echocardiogram from 2024 shows LVEF 60%, RV structure, function and size, no valvular disease, and ascending aorta measuring 4.1 cm.    A repeat echocardiogram from 2025 which shows LVEF 65-70%, trileaflet aortic valve with trace regurgitation stable ascending aorta measuring 4.1 cm, and aortic root  measuring 3.6 cm.    Ms. Chapman returns for annual cardiology follow up and review of recent echocardiogram. Overall reports feeling well from a cardiac standpoint. She denies chest pain, pressure, or tightness. Occasional shortness of breath that she believes is due to deconditioning due to physical limitations with right leg pain/weakness. No lower extremity swelling, orthopnea, or PND. No lightheadedness, dizziness, or syncope. No palpitations or racing heart.     Assessment:  Family history of Type a aortic dissection  Gene testing not indicated with history of negative testing in sister  Blood pressure well controlled  Echocardiogram 6/2/2025: ascending aorta stable at 4.1 cm, aortic root 3.6 cm  Hypertension, goal SBP < 120, DBP < 80 mmHg  BP today 114/81  Metoprolol succinate 25 mg daily, amlodipine 5 mg  Hyperlipidemia  Lipid panel 4/29/2025: cholesterol 216, HDL, 108, LDL 85,   Rosuvastatin 5 mg        Plan:   It was my pleasure to see Ms. Chapman for cardiology follow up today. Overall, she is doing very well from a cardiac standpoint. We reviewed her recent echocardiogram which shows stable ascending aortic dilatation measuring 4.1 cm. Will plan to repeat chest MRA and echocardiogram in one year.  Blood pressure well controlled on current regimen- no changes  Follow up with Dr. Lagunas in 1 year, sooner if any new concerns    Thank you for the opportunity to participate in this pleasant patient's care.    We would be happy to see this patient sooner for any concerns in the meantime.    LARRY Parra, CNP   Nurse Practitioner  [facility]    A total of 40 minutes was spent with patient, on chart review and documentation today.         Orders Placed This Encounter   Procedures     MRA Chest with Contrast     Follow-Up with Cardiology     Echocardiogram Complete     No orders of the defined types were placed in this encounter.    Medications Discontinued During This Encounter  "  Medication Reason     DULoxetine (CYMBALTA) 60 MG capsule Stopped by Patient (No AVS)     midazolam (VERSED) 1 MG/ML injection Stopped by Patient (No AVS)         Encounter Diagnosis   Name Primary?     Dilation of thoracic aorta        CURRENT MEDICATIONS:  Current Outpatient Medications   Medication Sig Dispense Refill     amLODIPine (NORVASC) 5 MG tablet Take 1 tablet (5 mg) by mouth daily. 90 tablet 3     aspirin 81 MG EC tablet Take 81 mg by mouth daily       metoprolol succinate ER (TOPROL XL) 25 MG 24 hr tablet Take 1 Tablet (25 mg) by mouth daily. 90 tablet 2     rosuvastatin (CRESTOR) 5 MG tablet Take 1 Tablet (5 mg) by mouth daily. 90 tablet 2       ALLERGIES     Allergies   Allergen Reactions     Latex Rash       PAST MEDICAL HISTORY:  No past medical history on file.    PAST SURGICAL HISTORY:  Past Surgical History:   Procedure Laterality Date     IR LUMBAR PUNCTURE  7/10/2024       FAMILY HISTORY:  Family History   Problem Relation Age of Onset     Aortic dissection Mother      Aortic dissection Sister      Aortic dissection Sister        SOCIAL HISTORY:  Social History     Socioeconomic History     Marital status:      Spouse name: None     Number of children: None     Years of education: None     Highest education level: None   Tobacco Use     Smoking status: Never     Smokeless tobacco: Never   Substance and Sexual Activity     Alcohol use: Yes     Comment: couple glasses of wine at night       Review of Systems:  Skin:        Eyes:       ENT:       Respiratory:       Cardiovascular:       Gastroenterology:      Genitourinary:       Musculoskeletal:       Neurologic:       Psychiatric:       Heme/Lymph/Imm:       Endocrine:         Physical Exam:    Vitals: /81 (BP Location: Left arm, Patient Position: Sitting)   Pulse 69   Ht 1.6 m (5' 3\")   Wt 53.1 kg (117 lb 1.6 oz)   SpO2 99%   BMI 20.74 kg/m    Constitutional: Well nourished and in no apparent distress.  Neck: " "Supple.  Respiratory: Breathing non-labored. Lungs clear to auscultation bilaterally. No crackles, wheezes, rhonchi, or rales.  Cardiovascular:  Regular rate and rhythm, normal S1 and S2. No murmur, rub, or gallop.  Skin: Warm, dry.   Extremities: No edema.  Neurologic: No gross motor deficits. Alert, awake, and oriented to person, place and time.  Psychiatric: Affect appropriate.        Recent Lab Results:  LIPID RESULTS:  No results found for: \"CHOL\", \"HDL\", \"LDL\", \"TRIG\", \"CHOLHDLRATIO\"    LIVER ENZYME RESULTS:  No results found for: \"AST\", \"ALT\"    CBC RESULTS:  No results found for: \"WBC\", \"RBC\", \"HGB\", \"HCT\", \"MCV\", \"MCH\", \"MCHC\", \"RDW\", \"PLT\"    BMP RESULTS:  No results found for: \"NA\", \"POTASSIUM\", \"CHLORIDE\", \"CO2\", \"ANIONGAP\", \"GLC\", \"BUN\", \"CR\", \"GFRESTIMATED\", \"GFRESTBLACK\", \"KARLA\"     A1C RESULTS:  No results found for: \"A1C\"    INR RESULTS:  No results found for: \"INR\"        CC  Clarisa Lagunas MD  [address] [place] [id number]                  Thank you for allowing me to participate in the care of your patient.      Sincerely,     LARRY Parra [facility]  cc:   Clarisa Lagunas MD  [address] [place] [id number]      "

## 2025-06-04 NOTE — TELEPHONE ENCOUNTER
Received [email or web address] message with attached labs. Per Eulalia, NP:     Eulalia Quiroz, APRN CNP to Me (Selected Message)\      6/4/25 12:43 PM  Thanks for passing along, I've updated my note with her labs. Cholesterol looks good- would continue rosuvastatin at current dose. Thanks, Eulalia    Sent pt [email or web address] message with recommendations.

## 2025-06-15 ENCOUNTER — HEALTH MAINTENANCE LETTER (OUTPATIENT)
Age: 70
End: 2025-06-15

## 2025-06-16 DIAGNOSIS — I77.810 DILATION OF THORACIC AORTA: ICD-10-CM

## 2025-06-18 DIAGNOSIS — I77.810 DILATION OF THORACIC AORTA: ICD-10-CM

## 2025-06-18 RX ORDER — METOPROLOL SUCCINATE 25 MG/1
25 TABLET, EXTENDED RELEASE ORAL DAILY
Qty: 90 TABLET | Refills: 4 | Status: SHIPPED | OUTPATIENT
Start: 2025-06-18

## 2025-06-18 RX ORDER — ROSUVASTATIN CALCIUM 5 MG/1
5 TABLET, COATED ORAL DAILY
Qty: 90 TABLET | Refills: 4 | Status: SHIPPED | OUTPATIENT
Start: 2025-06-18

## 2025-06-18 RX ORDER — ROSUVASTATIN CALCIUM 5 MG/1
5 TABLET, COATED ORAL DAILY
Qty: 90 TABLET | Refills: 2 | OUTPATIENT
Start: 2025-06-18

## 2025-06-18 RX ORDER — METOPROLOL SUCCINATE 25 MG/1
25 TABLET, EXTENDED RELEASE ORAL DAILY
Qty: 90 TABLET | Refills: 2 | OUTPATIENT
Start: 2025-06-18